# Patient Record
Sex: MALE | Race: WHITE | NOT HISPANIC OR LATINO | URBAN - METROPOLITAN AREA
[De-identification: names, ages, dates, MRNs, and addresses within clinical notes are randomized per-mention and may not be internally consistent; named-entity substitution may affect disease eponyms.]

---

## 2023-10-28 ENCOUNTER — EMERGENCY (EMERGENCY)
Facility: HOSPITAL | Age: 28
LOS: 1 days | Discharge: HOME | End: 2023-10-28
Attending: EMERGENCY MEDICINE | Admitting: EMERGENCY MEDICINE
Payer: SELF-PAY

## 2023-10-28 VITALS
TEMPERATURE: 98 F | RESPIRATION RATE: 16 BRPM | SYSTOLIC BLOOD PRESSURE: 154 MMHG | OXYGEN SATURATION: 98 % | DIASTOLIC BLOOD PRESSURE: 80 MMHG | HEART RATE: 62 BPM

## 2023-10-28 PROCEDURE — 73140 X-RAY EXAM OF FINGER(S): CPT | Mod: 26,RT,76

## 2023-10-28 PROCEDURE — 99284 EMERGENCY DEPT VISIT MOD MDM: CPT

## 2023-10-28 RX ORDER — BUPIVACAINE HCL/PF 7.5 MG/ML
20 VIAL (ML) INJECTION ONCE
Refills: 0 | Status: COMPLETED | OUTPATIENT
Start: 2023-10-28 | End: 2023-10-28

## 2023-10-28 RX ORDER — LIDOCAINE HCL 20 MG/ML
20 VIAL (ML) INJECTION ONCE
Refills: 0 | Status: COMPLETED | OUTPATIENT
Start: 2023-10-28 | End: 2023-10-28

## 2023-10-28 RX ORDER — ACETAMINOPHEN 500 MG
975 TABLET ORAL ONCE
Refills: 0 | Status: COMPLETED | OUTPATIENT
Start: 2023-10-28 | End: 2023-10-28

## 2023-10-28 RX ORDER — IBUPROFEN 200 MG
800 TABLET ORAL ONCE
Refills: 0 | Status: DISCONTINUED | OUTPATIENT
Start: 2023-10-28 | End: 2023-10-28

## 2023-10-28 RX ADMIN — Medication 975 MILLIGRAM(S): at 20:36

## 2023-10-28 NOTE — ED PROVIDER NOTE - CLINICAL SUMMARY MEDICAL DECISION MAKING FREE TEXT BOX
Pt with right thumb dislocation.  Xrays do not reveal any fractures.  Reduced with traction/countertraction without difficulty.  Had offered digital block but he declined.  He tolerated the reduction well.  He was placed in an aluminum thumb splint & referred to Ortho.

## 2023-10-28 NOTE — ED ADULT NURSE NOTE - OBJECTIVE STATEMENT
Pt is A&OX4. Dislocated right thumb while playing football today. Respirations even and unlabored on room air.

## 2023-10-28 NOTE — ED PROVIDER NOTE - OBJECTIVE STATEMENT
Patient is a previously healthy 28-year-old right-hand-dominant male who injured his right thumb playing football today.  He presents with pain and obvious deformity to the right thumb with an inability to move his distal phalanx.  He has no prior history of thumb fractures or dislocations in the past.  He has no other injuries or complaints.

## 2023-10-28 NOTE — ED ADULT TRIAGE NOTE - CHIEF COMPLAINT QUOTE
- see neutropenic fever   Dislocated right thumb. Unable to move it. Pt dislocated while playing football today.

## 2023-10-28 NOTE — ED PROVIDER NOTE - NSFOLLOWUPINSTRUCTIONS_ED_ALL_ED_FT
Thumb Dislocation    Thumb dislocation happens when the bones in a joint move out of their normal positions. Dislocations may occur in any joint in the fingers or thumb. Finger or thumb dislocation is a common and serious injury.    What are the causes?  This condition is caused by a forceful impact or injury to the hand or when the finger or thumb bends the wrong way (hyperextension).    What increases the risk?  You are more likely to develop this condition if you:    Have previously injured your hand.  Do repetitive motions with your hands, such as when playing sports or doing heavy labor.  Have poor hand strength and flexibility.    What are the signs or symptoms?  Symptoms of this condition may include:    Deformity of the injured area. The affected joint may look like it is out of place or at an odd angle.  Pain, swelling, and bruising in the injured area.  Limited range of motion of the finger or thumb.    How is this diagnosed?  This condition is diagnosed with a physical exam. You may also have X-rays to check for breaks (fractures) in your bones.    How is this treated?  For mild dislocations, this condition is treated by moving your finger or thumb back into position (reduction). Your health care provider may do this by hand (manually) or with surgery. You may need surgical reduction if you have:    A severe dislocation.  A dislocation that cannot be reduced manually.  A fractured bone.  An open wound.    After reduction, your finger or thumb may be kept in a fixed position (immobilized) with a splint for up to 6 weeks. You may also need physical therapy. In some cases, you may need to go to a health care provider who specializes in bone disorders (orthopedist) to help treat your condition.    Follow these instructions at home:      If you have a splint:    Do not put pressure on any part of the splint until it is fully hardened. This may take several hours.  Wear the splint as told by your health care provider. Remove it only as told by your health care provider.  Loosen the splint if your fingers tingle, become numb, or turn cold and blue.  Keep the splint clean.  If the splint is not waterproof:    Do not let it get wet.  Cover it with a watertight covering when you take a bath or shower.        Managing pain, stiffness, and swelling     If directed, put ice on the injured area.    If you have a removable splint, remove it as told by your health care provider.  Put ice in a plastic bag.  Place a towel between your skin and the bag.  Leave the ice on for 20 minutes, 2–3 times a day.  Move your fingers often to reduce stiffness and swelling.  Raise (elevate) the injured area above the level of your heart while you are sitting or lying down.        Activity    Return to your normal activities as told by your health care provider. Ask your health care provider what activities are safe for you.  Rest and limit your hand movement as told by your health care provider.  If physical therapy was prescribed, do exercises as told by your health care provider.        Driving    Ask your health care provider if the medicine prescribed to you requires you to avoid driving or using heavy machinery.  Ask your health care provider when it is safe to drive if you have a splint on your hand.        General instructions    Take over-the-counter and prescription medicines only as told by your health care provider.  Do not take baths, swim, or use a hot tub until your health care provider approves. Ask your health care provider if you may take showers. You may only be allowed to take sponge baths.  Do not use any products that contain nicotine or tobacco, such as cigarettes, e-cigarettes, and chewing tobacco. These can delay bone healing. If you need help quitting, ask your health care provider.  Keep all follow-up visits as told by your health care provider. This is important.    Contact a health care provider if you have:  Problems with your splint.  Pain that gets worse or does not get better with medicine.  More bruising, swelling, or redness in your injured area.  Difficulty moving your finger or thumb after it heals.    Get help right away if:  You develop numbness in your finger or thumb.  You cannot move your finger or thumb.  Your finger or thumb is pale or cold.  You have severe pain.    Summary  Finger or thumb dislocation happens when the bones in a joint move out of their normal positions.  This condition is caused by a forceful impact or injury to the hand.  For mild dislocations, this condition is treated by moving your finger or thumb back into position (reduction).  You may need surgery if you have a severe dislocation, an open wound, or a fractured bone.

## 2023-10-28 NOTE — ED PROVIDER NOTE - PHYSICAL EXAMINATION
Right hand:  There is obvious deformity to the right thumb at the first MCPJ and his distal phalanx is held in flexion.  The overlying skin is intact.  Distal sensation and capillary refill are intact.  The rest of his hand is nontender and the wrist is nontender with full range of motion

## 2023-10-28 NOTE — ED PROVIDER NOTE - CARE PROVIDER_API CALL
Rohan Mathew  Orthopaedic Surgery  [address] [place] [address]  Phone: [phone]  Fax: [phone]  Follow Up Time:

## 2023-10-28 NOTE — ED PROVIDER NOTE - PATIENT PORTAL LINK FT
You can access the FollowMyHealth Patient Portal offered by [facility] by registering at the following website: [email or web address]. By joining [employer]’s FollowMyHealth portal, you will also be able to view your health information using other applications (apps) compatible with our system.
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
## 2023-11-01 DIAGNOSIS — M79.644 PAIN IN RIGHT FINGER(S): ICD-10-CM
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
## 2023-11-01 DIAGNOSIS — Z88.6 ALLERGY STATUS TO ANALGESIC AGENT: ICD-10-CM
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
## 2023-11-01 DIAGNOSIS — S63.114A DISLOCATION OF METACARPOPHALANGEAL JOINT OF RIGHT THUMB, INITIAL ENCOUNTER: ICD-10-CM
